# Patient Record
Sex: FEMALE | Race: BLACK OR AFRICAN AMERICAN | NOT HISPANIC OR LATINO | URBAN - METROPOLITAN AREA
[De-identification: names, ages, dates, MRNs, and addresses within clinical notes are randomized per-mention and may not be internally consistent; named-entity substitution may affect disease eponyms.]

---

## 2018-02-02 ENCOUNTER — EMERGENCY (EMERGENCY)
Facility: HOSPITAL | Age: 28
LOS: 1 days | Discharge: HOME | End: 2018-02-02
Attending: EMERGENCY MEDICINE | Admitting: EMERGENCY MEDICINE
Payer: COMMERCIAL

## 2018-02-02 VITALS
SYSTOLIC BLOOD PRESSURE: 121 MMHG | HEART RATE: 71 BPM | DIASTOLIC BLOOD PRESSURE: 78 MMHG | WEIGHT: 138.01 LBS | OXYGEN SATURATION: 98 % | TEMPERATURE: 98 F | RESPIRATION RATE: 18 BRPM

## 2018-02-02 LAB
ALBUMIN SERPL ELPH-MCNC: 4.3 G/DL — SIGNIFICANT CHANGE UP (ref 3.3–5)
ALP SERPL-CCNC: 61 U/L — SIGNIFICANT CHANGE UP (ref 40–120)
ALT FLD-CCNC: 13 U/L RC — SIGNIFICANT CHANGE UP (ref 10–45)
ANION GAP SERPL CALC-SCNC: 11 MMOL/L — SIGNIFICANT CHANGE UP (ref 5–17)
APPEARANCE UR: CLEAR — SIGNIFICANT CHANGE UP
AST SERPL-CCNC: 14 U/L — SIGNIFICANT CHANGE UP (ref 10–40)
BACTERIA # UR AUTO: ABNORMAL /HPF
BILIRUB SERPL-MCNC: 0.3 MG/DL — SIGNIFICANT CHANGE UP (ref 0.2–1.2)
BILIRUB UR-MCNC: NEGATIVE — SIGNIFICANT CHANGE UP
BUN SERPL-MCNC: 9 MG/DL — SIGNIFICANT CHANGE UP (ref 7–23)
CALCIUM SERPL-MCNC: 9.4 MG/DL — SIGNIFICANT CHANGE UP (ref 8.4–10.5)
CHLORIDE SERPL-SCNC: 101 MMOL/L — SIGNIFICANT CHANGE UP (ref 96–108)
CO2 SERPL-SCNC: 26 MMOL/L — SIGNIFICANT CHANGE UP (ref 22–31)
COLOR SPEC: YELLOW — SIGNIFICANT CHANGE UP
COMMENT - URINE: SIGNIFICANT CHANGE UP
CREAT SERPL-MCNC: 0.7 MG/DL — SIGNIFICANT CHANGE UP (ref 0.5–1.3)
DIFF PNL FLD: NEGATIVE — SIGNIFICANT CHANGE UP
EPI CELLS # UR: SIGNIFICANT CHANGE UP /HPF
GLUCOSE SERPL-MCNC: 81 MG/DL — SIGNIFICANT CHANGE UP (ref 70–99)
GLUCOSE UR QL: NEGATIVE — SIGNIFICANT CHANGE UP
HCT VFR BLD CALC: 52.3 % — HIGH (ref 34.5–45)
HGB BLD-MCNC: 15.5 G/DL — SIGNIFICANT CHANGE UP (ref 11.5–15.5)
KETONES UR-MCNC: NEGATIVE — SIGNIFICANT CHANGE UP
LEUKOCYTE ESTERASE UR-ACNC: NEGATIVE — SIGNIFICANT CHANGE UP
LIDOCAIN IGE QN: 23 U/L — SIGNIFICANT CHANGE UP (ref 7–60)
MCHC RBC-ENTMCNC: 27.9 PG — SIGNIFICANT CHANGE UP (ref 27–34)
MCHC RBC-ENTMCNC: 29.7 GM/DL — LOW (ref 32–36)
MCV RBC AUTO: 94 FL — SIGNIFICANT CHANGE UP (ref 80–100)
NITRITE UR-MCNC: NEGATIVE — SIGNIFICANT CHANGE UP
PH UR: 6.5 — SIGNIFICANT CHANGE UP (ref 5–8)
PLATELET # BLD AUTO: 131 K/UL — LOW (ref 150–400)
POTASSIUM SERPL-MCNC: 3.7 MMOL/L — SIGNIFICANT CHANGE UP (ref 3.5–5.3)
POTASSIUM SERPL-SCNC: 3.7 MMOL/L — SIGNIFICANT CHANGE UP (ref 3.5–5.3)
PROT SERPL-MCNC: 7.5 G/DL — SIGNIFICANT CHANGE UP (ref 6–8.3)
PROT UR-MCNC: SIGNIFICANT CHANGE UP
RBC # BLD: 5.56 M/UL — HIGH (ref 3.8–5.2)
RBC # FLD: 11.8 % — SIGNIFICANT CHANGE UP (ref 10.3–14.5)
RBC CASTS # UR COMP ASSIST: SIGNIFICANT CHANGE UP /HPF (ref 0–2)
SODIUM SERPL-SCNC: 138 MMOL/L — SIGNIFICANT CHANGE UP (ref 135–145)
SP GR SPEC: 1.02 — SIGNIFICANT CHANGE UP (ref 1.01–1.02)
UROBILINOGEN FLD QL: NEGATIVE — SIGNIFICANT CHANGE UP
WBC # BLD: 9.8 K/UL — SIGNIFICANT CHANGE UP (ref 3.8–10.5)
WBC # FLD AUTO: 9.8 K/UL — SIGNIFICANT CHANGE UP (ref 3.8–10.5)
WBC UR QL: SIGNIFICANT CHANGE UP /HPF (ref 0–5)

## 2018-02-02 PROCEDURE — 99285 EMERGENCY DEPT VISIT HI MDM: CPT

## 2018-02-02 NOTE — ED PROVIDER NOTE - MEDICAL DECISION MAKING DETAILS
r/o ovarian torsion vs. TOA vs. PID vs. appendicitis. Low clinical suspicion for appendicitis given history and physical exam; labs, pelvic exam, transvaginal US, US appendix (very thin body habitus) r/o ovarian torsion vs. TOA vs. appendicitis. Low clinical suspicion for appendicitis given history and physical exam; labs, pelvic exam, transvaginal US, US appendix (very thin body habitus). Abdomen soft, no guarding or rebound. +Right adnexal TTP on pelvic exam, no abnormal vaginal discharge, afebrile. AMOS.

## 2018-02-02 NOTE — ED ADULT NURSE NOTE - OBJECTIVE STATEMENT
Pt c/o "rlq abd pain that has been on and off x 1 1/2 months . No n/v/d/fever, some frequency of urination."

## 2018-02-02 NOTE — ED PROVIDER NOTE - OBJECTIVE STATEMENT
26 yo female , PMH well controlled asthma presents to the ED with 2.5 weeks intermittent abdominal pain, states it is worse in the RLQ. + urinary frequency, denies other urinary symptoms including dysuria, hematuria, cloudy urine, foul smelling urine, vaginal discharge, vaginal bleeding. States pain is exacerbated by sexual intercourse. Patient  and states monogamous. + chills but denies fever, n/v, diarrhea, constipation, flank pain, rash, sick contacts, recent travel, trauma, previous surgeries. LMP 18. Takes OCP but discontinued recently.     PMD: [facility]

## 2018-02-02 NOTE — ED ADULT NURSE NOTE - CHPI ED SYMPTOMS NEG
no nausea/no hematuria/no blood in stool/no diarrhea/no vomiting/no abdominal distension/no dysuria/no fever/no burning urination

## 2018-02-02 NOTE — ED PROVIDER NOTE - ATTENDING CONTRIBUTION TO CARE
I was physically present for the E/M service provided. I agree with above history, physical, and plan which I have reviewed and edited where appropriate. I was physically present for the key portions of the service provided.    27F p/w 2 weeks of intermittent RLQ pain. Urinary frequency. No vaginal discharge. No N/V/D/C. I was physically present for the E/M service provided. I agree with above history, physical, and plan which I have reviewed and edited where appropriate. I was physically present for the key portions of the service provided.    27F p/w 2 weeks of intermittent RLQ pain. Urinary frequency. No vaginal discharge. No N/V/D/C. See LISA TRINIDAD.

## 2018-02-03 VITALS — TEMPERATURE: 98 F | OXYGEN SATURATION: 100 %

## 2018-02-03 LAB
N GONORRHOEA RRNA SPEC QL NAA+PROBE: SIGNIFICANT CHANGE UP
SPECIMEN SOURCE: SIGNIFICANT CHANGE UP

## 2018-02-03 PROCEDURE — 93975 VASCULAR STUDY: CPT

## 2018-02-03 PROCEDURE — 99284 EMERGENCY DEPT VISIT MOD MDM: CPT | Mod: 25

## 2018-02-03 PROCEDURE — 76705 ECHO EXAM OF ABDOMEN: CPT | Mod: 26,59

## 2018-02-03 PROCEDURE — 76830 TRANSVAGINAL US NON-OB: CPT

## 2018-02-03 PROCEDURE — 81001 URINALYSIS AUTO W/SCOPE: CPT

## 2018-02-03 PROCEDURE — 93976 VASCULAR STUDY: CPT | Mod: 26

## 2018-02-03 PROCEDURE — 76830 TRANSVAGINAL US NON-OB: CPT | Mod: 26

## 2018-02-03 PROCEDURE — 87591 N.GONORRHOEAE DNA AMP PROB: CPT

## 2018-02-03 PROCEDURE — 96374 THER/PROPH/DIAG INJ IV PUSH: CPT

## 2018-02-03 PROCEDURE — 87086 URINE CULTURE/COLONY COUNT: CPT

## 2018-02-03 PROCEDURE — 76705 ECHO EXAM OF ABDOMEN: CPT

## 2018-02-03 PROCEDURE — 83690 ASSAY OF LIPASE: CPT

## 2018-02-03 PROCEDURE — 85027 COMPLETE CBC AUTOMATED: CPT

## 2018-02-03 PROCEDURE — 80053 COMPREHEN METABOLIC PANEL: CPT

## 2018-02-03 RX ORDER — ACETAMINOPHEN 500 MG
1000 TABLET ORAL ONCE
Qty: 0 | Refills: 0 | Status: COMPLETED | OUTPATIENT
Start: 2018-02-02 | End: 2018-02-03

## 2018-02-03 RX ORDER — SODIUM CHLORIDE 9 MG/ML
1000 INJECTION, SOLUTION INTRAVENOUS ONCE
Qty: 0 | Refills: 0 | Status: COMPLETED | OUTPATIENT
Start: 2018-02-03 | End: 2018-02-03

## 2018-02-03 RX ADMIN — Medication 400 MILLIGRAM(S): at 00:14

## 2018-02-03 RX ADMIN — Medication 1000 MILLIGRAM(S): at 02:38

## 2018-02-03 RX ADMIN — SODIUM CHLORIDE 1000 MILLILITER(S): 9 INJECTION, SOLUTION INTRAVENOUS at 01:21

## 2018-02-03 NOTE — ED ADULT NURSE REASSESSMENT NOTE - NS ED NURSE REASSESS COMMENT FT1
Received report from [facility]. Patient resting and comfortable. No complaints of pain at this time. In no signs of acute distress. VSS. Safety and comfort measures provided and maintained.

## 2018-02-04 LAB
CULTURE RESULTS: NO GROWTH — SIGNIFICANT CHANGE UP
SPECIMEN SOURCE: SIGNIFICANT CHANGE UP

## 2020-02-16 ENCOUNTER — TRANSCRIPTION ENCOUNTER (OUTPATIENT)
Age: 30
End: 2020-02-16

## 2020-02-17 ENCOUNTER — INPATIENT (INPATIENT)
Facility: HOSPITAL | Age: 30
LOS: 0 days | Discharge: HOME | DRG: 817 | End: 2020-02-17
Attending: OBSTETRICS & GYNECOLOGY | Admitting: OBSTETRICS & GYNECOLOGY
Payer: COMMERCIAL

## 2020-02-17 ENCOUNTER — RESULT REVIEW (OUTPATIENT)
Age: 30
End: 2020-02-17

## 2020-02-17 VITALS
RESPIRATION RATE: 18 BRPM | OXYGEN SATURATION: 100 % | HEIGHT: 67 IN | HEART RATE: 70 BPM | SYSTOLIC BLOOD PRESSURE: 111 MMHG | WEIGHT: 145.06 LBS | TEMPERATURE: 98 F | DIASTOLIC BLOOD PRESSURE: 75 MMHG

## 2020-02-17 VITALS
TEMPERATURE: 98 F | DIASTOLIC BLOOD PRESSURE: 75 MMHG | HEART RATE: 82 BPM | RESPIRATION RATE: 16 BRPM | SYSTOLIC BLOOD PRESSURE: 115 MMHG | OXYGEN SATURATION: 100 %

## 2020-02-17 DIAGNOSIS — O20.0 THREATENED ABORTION: ICD-10-CM

## 2020-02-17 DIAGNOSIS — O36.80X0 PREGNANCY WITH INCONCLUSIVE FETAL VIABILITY, NOT APPLICABLE OR UNSPECIFIED: ICD-10-CM

## 2020-02-17 LAB
ALBUMIN SERPL ELPH-MCNC: 4.3 G/DL — SIGNIFICANT CHANGE UP (ref 3.3–5)
ALP SERPL-CCNC: 63 U/L — SIGNIFICANT CHANGE UP (ref 40–120)
ALT FLD-CCNC: 14 U/L — SIGNIFICANT CHANGE UP (ref 10–45)
ANION GAP SERPL CALC-SCNC: 12 MMOL/L — SIGNIFICANT CHANGE UP (ref 5–17)
APPEARANCE UR: CLEAR — SIGNIFICANT CHANGE UP
AST SERPL-CCNC: 15 U/L — SIGNIFICANT CHANGE UP (ref 10–40)
BACTERIA # UR AUTO: NEGATIVE — SIGNIFICANT CHANGE UP
BASOPHILS # BLD AUTO: 0.04 K/UL — SIGNIFICANT CHANGE UP (ref 0–0.2)
BASOPHILS NFR BLD AUTO: 0.4 % — SIGNIFICANT CHANGE UP (ref 0–2)
BILIRUB SERPL-MCNC: 0.5 MG/DL — SIGNIFICANT CHANGE UP (ref 0.2–1.2)
BILIRUB UR-MCNC: NEGATIVE — SIGNIFICANT CHANGE UP
BLD GP AB SCN SERPL QL: NEGATIVE — SIGNIFICANT CHANGE UP
BUN SERPL-MCNC: 8 MG/DL — SIGNIFICANT CHANGE UP (ref 7–23)
CALCIUM SERPL-MCNC: 9.5 MG/DL — SIGNIFICANT CHANGE UP (ref 8.4–10.5)
CHLORIDE SERPL-SCNC: 102 MMOL/L — SIGNIFICANT CHANGE UP (ref 96–108)
CO2 SERPL-SCNC: 24 MMOL/L — SIGNIFICANT CHANGE UP (ref 22–31)
COLOR SPEC: YELLOW — SIGNIFICANT CHANGE UP
CREAT SERPL-MCNC: 0.62 MG/DL — SIGNIFICANT CHANGE UP (ref 0.5–1.3)
DIFF PNL FLD: ABNORMAL
EOSINOPHIL # BLD AUTO: 0.19 K/UL — SIGNIFICANT CHANGE UP (ref 0–0.5)
EOSINOPHIL NFR BLD AUTO: 1.8 % — SIGNIFICANT CHANGE UP (ref 0–6)
EPI CELLS # UR: 1 /HPF — SIGNIFICANT CHANGE UP
GLUCOSE SERPL-MCNC: 81 MG/DL — SIGNIFICANT CHANGE UP (ref 70–99)
GLUCOSE UR QL: NEGATIVE — SIGNIFICANT CHANGE UP
HCG SERPL-ACNC: 666.7 MIU/ML — HIGH
HCT VFR BLD CALC: 32.8 % — LOW (ref 34.5–45)
HGB BLD-MCNC: 10.3 G/DL — LOW (ref 11.5–15.5)
HYALINE CASTS # UR AUTO: 2 /LPF — SIGNIFICANT CHANGE UP (ref 0–2)
IMM GRANULOCYTES NFR BLD AUTO: 0.2 % — SIGNIFICANT CHANGE UP (ref 0–1.5)
KETONES UR-MCNC: NEGATIVE — SIGNIFICANT CHANGE UP
LEUKOCYTE ESTERASE UR-ACNC: NEGATIVE — SIGNIFICANT CHANGE UP
LYMPHOCYTES # BLD AUTO: 1.81 K/UL — SIGNIFICANT CHANGE UP (ref 1–3.3)
LYMPHOCYTES # BLD AUTO: 17.2 % — SIGNIFICANT CHANGE UP (ref 13–44)
MCHC RBC-ENTMCNC: 28.9 PG — SIGNIFICANT CHANGE UP (ref 27–34)
MCHC RBC-ENTMCNC: 31.4 GM/DL — LOW (ref 32–36)
MCV RBC AUTO: 92.1 FL — SIGNIFICANT CHANGE UP (ref 80–100)
MONOCYTES # BLD AUTO: 0.85 K/UL — SIGNIFICANT CHANGE UP (ref 0–0.9)
MONOCYTES NFR BLD AUTO: 8.1 % — SIGNIFICANT CHANGE UP (ref 2–14)
NEUTROPHILS # BLD AUTO: 7.6 K/UL — HIGH (ref 1.8–7.4)
NEUTROPHILS NFR BLD AUTO: 72.3 % — SIGNIFICANT CHANGE UP (ref 43–77)
NITRITE UR-MCNC: NEGATIVE — SIGNIFICANT CHANGE UP
NRBC # BLD: 0 /100 WBCS — SIGNIFICANT CHANGE UP (ref 0–0)
PH UR: 6.5 — SIGNIFICANT CHANGE UP (ref 5–8)
PLATELET # BLD AUTO: 253 K/UL — SIGNIFICANT CHANGE UP (ref 150–400)
POTASSIUM SERPL-MCNC: 3.8 MMOL/L — SIGNIFICANT CHANGE UP (ref 3.5–5.3)
POTASSIUM SERPL-SCNC: 3.8 MMOL/L — SIGNIFICANT CHANGE UP (ref 3.5–5.3)
PROT SERPL-MCNC: 7.8 G/DL — SIGNIFICANT CHANGE UP (ref 6–8.3)
PROT UR-MCNC: ABNORMAL
RBC # BLD: 3.56 M/UL — LOW (ref 3.8–5.2)
RBC # FLD: 12.8 % — SIGNIFICANT CHANGE UP (ref 10.3–14.5)
RBC CASTS # UR COMP ASSIST: 0 /HPF — SIGNIFICANT CHANGE UP (ref 0–4)
RH IG SCN BLD-IMP: POSITIVE — SIGNIFICANT CHANGE UP
SODIUM SERPL-SCNC: 138 MMOL/L — SIGNIFICANT CHANGE UP (ref 135–145)
SP GR SPEC: 1.03 — HIGH (ref 1.01–1.02)
UROBILINOGEN FLD QL: NEGATIVE — SIGNIFICANT CHANGE UP
WBC # BLD: 10.51 K/UL — HIGH (ref 3.8–10.5)
WBC # FLD AUTO: 10.51 K/UL — HIGH (ref 3.8–10.5)
WBC UR QL: 2 /HPF — SIGNIFICANT CHANGE UP (ref 0–5)

## 2020-02-17 PROCEDURE — 93975 VASCULAR STUDY: CPT

## 2020-02-17 PROCEDURE — 88342 IMHCHEM/IMCYTCHM 1ST ANTB: CPT | Mod: 26

## 2020-02-17 PROCEDURE — 81001 URINALYSIS AUTO W/SCOPE: CPT

## 2020-02-17 PROCEDURE — 86900 BLOOD TYPING SEROLOGIC ABO: CPT

## 2020-02-17 PROCEDURE — 84702 CHORIONIC GONADOTROPIN TEST: CPT

## 2020-02-17 PROCEDURE — 80053 COMPREHEN METABOLIC PANEL: CPT

## 2020-02-17 PROCEDURE — C1889: CPT

## 2020-02-17 PROCEDURE — 88305 TISSUE EXAM BY PATHOLOGIST: CPT

## 2020-02-17 PROCEDURE — 58661 LAPAROSCOPY REMOVE ADNEXA: CPT

## 2020-02-17 PROCEDURE — 85027 COMPLETE CBC AUTOMATED: CPT

## 2020-02-17 PROCEDURE — 88342 IMHCHEM/IMCYTCHM 1ST ANTB: CPT

## 2020-02-17 PROCEDURE — 87086 URINE CULTURE/COLONY COUNT: CPT

## 2020-02-17 PROCEDURE — 99291 CRITICAL CARE FIRST HOUR: CPT

## 2020-02-17 PROCEDURE — 88305 TISSUE EXAM BY PATHOLOGIST: CPT | Mod: 26

## 2020-02-17 PROCEDURE — 99285 EMERGENCY DEPT VISIT HI MDM: CPT

## 2020-02-17 PROCEDURE — 86901 BLOOD TYPING SEROLOGIC RH(D): CPT

## 2020-02-17 PROCEDURE — 93975 VASCULAR STUDY: CPT | Mod: 26

## 2020-02-17 PROCEDURE — 86850 RBC ANTIBODY SCREEN: CPT

## 2020-02-17 RX ORDER — ONDANSETRON 8 MG/1
4 TABLET, FILM COATED ORAL ONCE
Refills: 0 | Status: DISCONTINUED | OUTPATIENT
Start: 2020-02-17 | End: 2020-02-17

## 2020-02-17 RX ORDER — HYDROMORPHONE HYDROCHLORIDE 2 MG/ML
0.5 INJECTION INTRAMUSCULAR; INTRAVENOUS; SUBCUTANEOUS
Refills: 0 | Status: DISCONTINUED | OUTPATIENT
Start: 2020-02-17 | End: 2020-02-17

## 2020-02-17 RX ORDER — OXYCODONE HYDROCHLORIDE 5 MG/1
1 TABLET ORAL
Qty: 5 | Refills: 0
Start: 2020-02-17 | End: 2020-02-17

## 2020-02-17 RX ORDER — SODIUM CHLORIDE 9 MG/ML
1000 INJECTION, SOLUTION INTRAVENOUS
Refills: 0 | Status: DISCONTINUED | OUTPATIENT
Start: 2020-02-17 | End: 2020-02-17

## 2020-02-17 NOTE — H&P ADULT - ASSESSMENT
The patient is a 28YO  LMP unknown presenting w/ VB, abdominal pain, +bHCG, and peritoneal symptoms. TVUS demonstrates free fluid in abdomen. Patient needs emergent diagnostic laparoscopy for possible ruptured ectopic pregnancy.

## 2020-02-17 NOTE — ED ADULT NURSE NOTE - OBJECTIVE STATEMENT
29 yr old female with  from home c/o abd pain, +UCG positive, LMP: feb 2020, +c/o diarrhea after returning from [place] this morning, at present clear lungs, abd +soft, tender, skin wdi, vaginal bleeding,  present

## 2020-02-17 NOTE — ASU DISCHARGE PLAN (ADULT/PEDIATRIC) - ACTIVITY LEVEL
No douching/No excercise/No heavy lifting/Exercise/No sports/gym/No weight bearing/No tampons/Nothing per vagina/No tub baths/No intercourse

## 2020-02-17 NOTE — BRIEF OPERATIVE NOTE - NSICDXBRIEFPROCEDURE_GEN_ALL_CORE_FT
PROCEDURES:  Laparoscopic left salpingectomy for ectopic pregnancy 17-Feb-2020 17:32:43  Curt Cardona

## 2020-02-17 NOTE — ASU DISCHARGE PLAN (ADULT/PEDIATRIC) - PROVIDER TOKENS
FREE:[LAST:[Clinic],PHONE:[(   )    -],FAX:[(   )    -],ADDRESS:[Please schedule an appointment for 2 week follow up at [address] [place] [address], 2nd floor.    Please schedule an appointment (PHONE #  [phone] )]] FREE:[LAST:[Clinic],PHONE:[(   )    -],FAX:[(   )    -],ADDRESS:[Please schedule an appointment for 2 week follow up at [address] [place] [address], 2nd floor.    Please schedule an appointment (PHONE #  [phone] )]],FREE:[LAST:[Private MD],PHONE:[(   )    -],FAX:[(   )    -],ADDRESS:[Please follow up with your private MD in 48hours for repeat bHCG.]]

## 2020-02-17 NOTE — ED PROVIDER NOTE - CARE PLAN
Principal Discharge DX:	Threatened miscarriage in early pregnancy  Secondary Diagnosis:	Diarrhea Principal Discharge DX:	Threatened miscarriage in early pregnancy  Secondary Diagnosis:	Diarrhea  Secondary Diagnosis:	Pregnancy of unknown anatomic location

## 2020-02-17 NOTE — ASU DISCHARGE PLAN (ADULT/PEDIATRIC) - CALL YOUR DOCTOR IF YOU HAVE ANY OF THE FOLLOWING:
Fever greater than (need to indicate Fahrenheit or Celsius)/Numbness, tingling, color or temperature change to extremity/Inability to tolerate liquids or foods/Bleeding that does not stop/Swelling that gets worse/Excessive diarrhea/Increased irritability or sluggishness/Unable to urinate/Pain not relieved by Medications/Nausea and vomiting that does not stop/Wound/Surgical Site with redness, or foul smelling discharge or pus

## 2020-02-17 NOTE — H&P ADULT - PROBLEM SELECTOR PLAN 1
Plan:  -CBC, T&S, Coags, bHCG  -TVUS  -Monitor VS  -Emergent addon to OR for dx laparoscopy and left salpingectomy for ruptured ectopic pregnancy  -NPO  -LR@125  -ADmit to GYN    [facility] d/w & e/w Dr. Brar

## 2020-02-17 NOTE — CHART NOTE - NSCHARTNOTEFT_GEN_A_CORE
R2 GYN POST-OP CHECK    Patient seen and evaluated at bedside.  Pt awake and alert resting comfortably in chair  Patient reports pain controlled with analgesia.   Pt denies N/V, SOB, CP, palpitations, fever/chills.   Tolerating clears.    Ambulating with assistance.  Tolerating clears    O:   T(C): 36.4 (02-17-20 @ 19:00), Max: 36.4 (02-17-20 @ 17:15)  HR: 89 (02-17-20 @ 19:00) (82 - 106)  BP: 112/72 (02-17-20 @ 19:00) (112/72 - 119/70)  RR: 16 (02-17-20 @ 19:00) (16 - 18)  SpO2: 100% (02-17-20 @ 19:00) (100% - 100%)  Wt(kg): --  I&O's Summary    17 Feb 2020 07:01  -  17 Feb 2020 19:50  --------------------------------------------------------  IN: 550 mL / OUT: 250 mL / NET: 300 mL        Gen: Resting comfortably in bed, NAD  CV: S1S2, RRR  Lungs: CTA B/L  Abd: soft, appropriately tender, occasional BS x 4 quadrants.    Inc: Laproscopic port sites clean/dry/intact, dermabond closure  Ext: SCD's in place and functional, non-tender b/l, no edema    HYDROmorphone  Injectable 0.5 milliGRAM(s) IV Push every 10 minutes PRN  lactated ringers. 1000 milliLiter(s) IV Continuous <Continuous>  ondansetron Injectable 4 milliGRAM(s) IV Push once PRN      A/P: 29y Female s/p lsc L salpingectomy for ruptured ectopic pregnancy. Patient counseled at bedside re: intraop findings including L hematosalpinx and likely aborting L ectopic pregnancy. However, no villi seen on gross examination of tissue intraop. Patient counseled that although likelihood of intrauterine pregnancy is low, recommend follow up in 24-48h with primary ob/gyn outpatient for repeat HCG to confirm removal of ectopic pregnancy. Patient understands, confirmed contact numbers. Patient to be added to gyn list to confirm follow up outpatient.    Neuro: PO Analgesia PRN  CV: Hemodynamically stable.  Monitor VS.   Pulm: Saturating well on room air.  Encourage OOB    GI: Advance to regular diet. Anti-emetics PRN.  : voiding spontaneously  FEN: SLIV  Heme: DVT ppx w/ SCD's while in bed. Early ambulation, initially with assistance then as tolerated.    ID: Afebrile  Endo: No active issues   Dispo: Discharge home when criteria met.     Yael Rodríguez MD PGY2  Pager #[phone]

## 2020-02-17 NOTE — ED PROVIDER NOTE - OBJECTIVE STATEMENT
29y female with no significant PMH presenting with vaginal bleeding with positive urine pregnancy. Had period on 1/19 normal period lasting 5 days. Had recurrence of bleeding on 2/8, again bleeding like her normal period. + u preg at home. Bleeding again starting 3 days ago, spotting, no clots, different from her normal period, also had lower abdominal pain, non-radiating. No fevers, urinary symptoms.

## 2020-02-17 NOTE — BRIEF OPERATIVE NOTE - OPERATION/FINDINGS
Grossly normal bowel, liver, omentum, uterus, ovaries. Approximately 300cc Hemoperitoneum upon entry. Left sided ectopic pregnancy noted appeared to be aborted from left fallopian tube. Bilateral dilated fallopian tubes.

## 2020-02-17 NOTE — ASU DISCHARGE PLAN (ADULT/PEDIATRIC) - PAIN MANAGEMENT
Prescriptions electronically submitted to pharmacy from [facility]/Take over the counter pain medication

## 2020-02-17 NOTE — H&P ADULT - ATTENDING COMMENTS
pt seen and examined. agree with above note. pt counseled regarding need for emergent surgery to evaluate possible ruptured ectopic. informed consent signed and witnessed. all questions answered. to proceed with procedure as stated above.     NEVILLE Brar

## 2020-02-17 NOTE — H&P ADULT - NSHPPHYSICALEXAM_GEN_ALL_CORE
Vital Signs Last 24 Hrs  T(C): 36.6 (17 Feb 2020 10:11), Max: 36.6 (17 Feb 2020 10:11)  T(F): 97.9 (17 Feb 2020 10:11), Max: 97.9 (17 Feb 2020 10:11)  HR: 70 (17 Feb 2020 10:11) (70 - 70)  BP: 111/75 (17 Feb 2020 10:11) (111/75 - 111/75)  BP(mean): --  RR: 18 (17 Feb 2020 10:11) (18 - 18)  SpO2: 100% (17 Feb 2020 10:11) (100% - 100%)    Gen: A&Ox3, uncomfortable  Abd: Soft, Diffuse lower abdominal tenderness, +rebound tenderness  VE: Minimal dark blood in vault, No active bleeding from os, Os closed

## 2020-02-17 NOTE — ASU DISCHARGE PLAN (ADULT/PEDIATRIC) - CARE PROVIDER_API CALL
Clinic,   Please schedule an appointment for 2 week follow up at [address] [place] [address], 2nd floor.    Please schedule an appointment (PHONE #  [phone] )  Phone: (   )    -  Fax: (   )    -  Follow Up Time: Clinic,   Please schedule an appointment for 2 week follow up at [address] [place] [address], 2nd floor.    Please schedule an appointment (PHONE #  [phone] )  Phone: (   )    -  Fax: (   )    -  Follow Up Time:     Private MD,   Please follow up with your private MD in 48hours for repeat bHCG.  Phone: (   )    -  Fax: (   )    -  Follow Up Time:

## 2020-02-17 NOTE — H&P ADULT - HISTORY OF PRESENT ILLNESS
Pt is a 28YO  Unsure of LMP (possibly 2/3) presenting w/ vaginal bleeding and abdominal pain. The pt said that she has had some vaginal spotting and irregular bleeding . She had her period on 2/3. Then had additional vaginal spotting on Friday. She said that the spotting has been persistent but she did not have to change pads at all. Says it is dark blood. No passage of tissue/clots. She said she felt mildly dizzy but is otherwise asymptomatic.    TVUS(): Demonstrates moderate free fluid in left adnexa/pelvis.  C    OBHx: G0  GYN: abnormal pap w/ normal colpo  PMH: Asthma   PSH: Neg  Meds: Ventolin (inactive)  All: NKDA  Psych: neg  Soc: neg  Fhx: neg

## 2020-02-17 NOTE — ASU DISCHARGE PLAN (ADULT/PEDIATRIC) - ASU DC SPECIAL INSTRUCTIONSFT
Please follow up with your private GYN in 48hours for repeat bHCG. bHCG today was 666. If bHCG is not down-trending would consider imaging.

## 2020-02-17 NOTE — ED PROVIDER NOTE - ATTENDING CONTRIBUTION TO CARE
------------ATTENDING NOTE------------   pt w/ partner c/o several days of light uterine spotting, having constant mild ache/pressure in midline pelvis, worse w/ bearing down (to urinate/defecate), also 5 days of increased loose watery nonbloody stools since traveling to [place], recent menstruation has been irregular but last "normal" menstruation 8 wks ago, has increased urinary frequency, no fevers, no chest pain/discomfort or sob/dyspnea, HD stable, soft benign abd, awaiting labs/imaging and close reassessments -->  - Ron Young MD   ------------------------------------------------ ------------ATTENDING NOTE------------   pt w/ partner c/o several days of light uterine spotting, having constant mild ache/pressure in midline pelvis, worse w/ bearing down (to urinate/defecate), also 5 days of increased loose watery nonbloody stools since traveling to [place], recent menstruation has been irregular but last "normal" menstruation 8 wks ago, has increased urinary frequency, no fevers, no chest pain/discomfort or sob/dyspnea, HD stable, soft benign abd, awaiting labs/imaging and close reassessments --> very likely ruptured ectopic, continued pain, evaluated by OBGYN team for admission/OR.  - Ron Young MD   ------------------------------------------------

## 2020-02-18 LAB
CULTURE RESULTS: NO GROWTH — SIGNIFICANT CHANGE UP
SPECIMEN SOURCE: SIGNIFICANT CHANGE UP

## 2020-02-19 PROBLEM — Z00.00 ENCOUNTER FOR PREVENTIVE HEALTH EXAMINATION: Status: ACTIVE | Noted: 2020-02-19

## 2020-02-19 NOTE — CHART NOTE - NSCHARTNOTEFT_GEN_A_CORE
LMP 2/3 a/w ruptured ectopic s/p Lsc L salpingectomy, evacuation of hemoperitoneum on     Called and spoke with patient. Pt feeling well postoperatively. Mild bleeding/abdominal cramping. Pain controlled with motrin/tylenol. No N/V, no fever/chills.   Pt seen by primary gyn at [facility] today. Pt reports blood were drawn, exam was normal. Pt for repeat labs on Friday with appt scheduled.     Pt recovering well postoperatively and with appropriate follow up and care from ectopic pregnancy.   Inpt Gyn Team to sign off.   Will follow up surgical pathology, and notify pt if findings are abnormal.     sarah beth Galvez PGY1

## 2021-05-24 ENCOUNTER — ASOB RESULT (OUTPATIENT)
Age: 31
End: 2021-05-24

## 2021-05-24 ENCOUNTER — APPOINTMENT (OUTPATIENT)
Dept: ANTEPARTUM | Facility: CLINIC | Age: 31
End: 2021-05-24
Payer: COMMERCIAL

## 2021-05-24 PROCEDURE — 76811 OB US DETAILED SNGL FETUS: CPT

## 2021-05-24 PROCEDURE — 99072 ADDL SUPL MATRL&STAF TM PHE: CPT

## 2021-09-29 ENCOUNTER — ASOB RESULT (OUTPATIENT)
Age: 31
End: 2021-09-29

## 2021-09-29 ENCOUNTER — APPOINTMENT (OUTPATIENT)
Dept: ANTEPARTUM | Facility: CLINIC | Age: 31
End: 2021-09-29
Payer: COMMERCIAL

## 2021-09-29 PROCEDURE — 76816 OB US FOLLOW-UP PER FETUS: CPT

## 2021-09-29 PROCEDURE — 76819 FETAL BIOPHYS PROFIL W/O NST: CPT

## 2021-10-12 ENCOUNTER — INPATIENT (INPATIENT)
Facility: HOSPITAL | Age: 31
LOS: 2 days | Discharge: HOME | End: 2021-10-15
Attending: OBSTETRICS & GYNECOLOGY | Admitting: OBSTETRICS & GYNECOLOGY

## 2021-10-12 VITALS
RESPIRATION RATE: 16 BRPM | SYSTOLIC BLOOD PRESSURE: 125 MMHG | HEART RATE: 60 BPM | DIASTOLIC BLOOD PRESSURE: 76 MMHG | TEMPERATURE: 100 F

## 2021-10-12 DIAGNOSIS — Z90.79 ACQUIRED ABSENCE OF OTHER GENITAL ORGAN(S): Chronic | ICD-10-CM

## 2021-10-12 DIAGNOSIS — Z3A.00 WEEKS OF GESTATION OF PREGNANCY NOT SPECIFIED: ICD-10-CM

## 2021-10-12 DIAGNOSIS — O26.899 OTHER SPECIFIED PREGNANCY RELATED CONDITIONS, UNSPECIFIED TRIMESTER: ICD-10-CM

## 2021-10-13 DIAGNOSIS — O36.8130 DECREASED FETAL MOVEMENTS, THIRD TRIMESTER, NOT APPLICABLE OR UNSPECIFIED: ICD-10-CM

## 2021-10-13 DIAGNOSIS — O26.899 OTHER SPECIFIED PREGNANCY RELATED CONDITIONS, UNSPECIFIED TRIMESTER: ICD-10-CM

## 2021-10-13 LAB
BASOPHILS # BLD AUTO: 0.03 K/UL — SIGNIFICANT CHANGE UP (ref 0–0.2)
BASOPHILS NFR BLD AUTO: 0.3 % — SIGNIFICANT CHANGE UP (ref 0–2)
BLD GP AB SCN SERPL QL: NEGATIVE — SIGNIFICANT CHANGE UP
COVID-19 SPIKE DOMAIN AB INTERP: POSITIVE
COVID-19 SPIKE DOMAIN ANTIBODY RESULT: 69.6 U/ML — HIGH
EOSINOPHIL # BLD AUTO: 0.03 K/UL — SIGNIFICANT CHANGE UP (ref 0–0.5)
EOSINOPHIL NFR BLD AUTO: 0.3 % — SIGNIFICANT CHANGE UP (ref 0–6)
HBV SURFACE AG SERPL QL IA: SIGNIFICANT CHANGE UP
HCT VFR BLD CALC: 37.4 % — SIGNIFICANT CHANGE UP (ref 34.5–45)
HGB BLD-MCNC: 12.8 G/DL — SIGNIFICANT CHANGE UP (ref 11.5–15.5)
IANC: 7.52 K/UL — SIGNIFICANT CHANGE UP (ref 1.5–8.5)
IMM GRANULOCYTES NFR BLD AUTO: 1.1 % — SIGNIFICANT CHANGE UP (ref 0–1.5)
LYMPHOCYTES # BLD AUTO: 1.91 K/UL — SIGNIFICANT CHANGE UP (ref 1–3.3)
LYMPHOCYTES # BLD AUTO: 18.2 % — SIGNIFICANT CHANGE UP (ref 13–44)
MCHC RBC-ENTMCNC: 30.8 PG — SIGNIFICANT CHANGE UP (ref 27–34)
MCHC RBC-ENTMCNC: 34.2 GM/DL — SIGNIFICANT CHANGE UP (ref 32–36)
MCV RBC AUTO: 89.9 FL — SIGNIFICANT CHANGE UP (ref 80–100)
MONOCYTES # BLD AUTO: 0.91 K/UL — HIGH (ref 0–0.9)
MONOCYTES NFR BLD AUTO: 8.7 % — SIGNIFICANT CHANGE UP (ref 2–14)
NEUTROPHILS # BLD AUTO: 7.52 K/UL — HIGH (ref 1.8–7.4)
NEUTROPHILS NFR BLD AUTO: 71.4 % — SIGNIFICANT CHANGE UP (ref 43–77)
NRBC # BLD: 0 /100 WBCS — SIGNIFICANT CHANGE UP
NRBC # FLD: 0 K/UL — SIGNIFICANT CHANGE UP
PLATELET # BLD AUTO: 164 K/UL — SIGNIFICANT CHANGE UP (ref 150–400)
RBC # BLD: 4.16 M/UL — SIGNIFICANT CHANGE UP (ref 3.8–5.2)
RBC # FLD: 13.9 % — SIGNIFICANT CHANGE UP (ref 10.3–14.5)
RH IG SCN BLD-IMP: POSITIVE — SIGNIFICANT CHANGE UP
RH IG SCN BLD-IMP: POSITIVE — SIGNIFICANT CHANGE UP
RUBV IGG SER-ACNC: 2.6 INDEX — SIGNIFICANT CHANGE UP
RUBV IGG SER-IMP: POSITIVE — SIGNIFICANT CHANGE UP
SARS-COV-2 IGG+IGM SERPL QL IA: 69.6 U/ML — HIGH
SARS-COV-2 IGG+IGM SERPL QL IA: POSITIVE
SARS-COV-2 RNA SPEC QL NAA+PROBE: SIGNIFICANT CHANGE UP
T PALLIDUM AB TITR SER: NEGATIVE — SIGNIFICANT CHANGE UP
WBC # BLD: 10.52 K/UL — HIGH (ref 3.8–10.5)
WBC # FLD AUTO: 10.52 K/UL — HIGH (ref 3.8–10.5)

## 2021-10-13 RX ORDER — SODIUM CHLORIDE 9 MG/ML
1000 INJECTION, SOLUTION INTRAVENOUS
Refills: 0 | Status: DISCONTINUED | OUTPATIENT
Start: 2021-10-13 | End: 2021-10-13

## 2021-10-13 RX ORDER — OXYTOCIN 10 UNIT/ML
333.33 VIAL (ML) INJECTION
Qty: 20 | Refills: 0 | Status: DISCONTINUED | OUTPATIENT
Start: 2021-10-13 | End: 2021-10-15

## 2021-10-13 RX ORDER — INFLUENZA VIRUS VACCINE 15; 15; 15; 15 UG/.5ML; UG/.5ML; UG/.5ML; UG/.5ML
0.5 SUSPENSION INTRAMUSCULAR ONCE
Refills: 0 | Status: DISCONTINUED | OUTPATIENT
Start: 2021-10-13 | End: 2021-10-15

## 2021-10-13 RX ORDER — ALBUTEROL 90 UG/1
2 AEROSOL, METERED ORAL EVERY 6 HOURS
Refills: 0 | Status: DISCONTINUED | OUTPATIENT
Start: 2021-10-13 | End: 2021-10-15

## 2021-10-13 RX ORDER — BUTORPHANOL TARTRATE 2 MG/ML
2 INJECTION, SOLUTION INTRAMUSCULAR; INTRAVENOUS ONCE
Refills: 0 | Status: DISCONTINUED | OUTPATIENT
Start: 2021-10-13 | End: 2021-10-13

## 2021-10-13 RX ORDER — OXYTOCIN 10 UNIT/ML
333.33 VIAL (ML) INJECTION
Qty: 20 | Refills: 0 | Status: DISCONTINUED | OUTPATIENT
Start: 2021-10-13 | End: 2021-10-13

## 2021-10-13 RX ADMIN — SODIUM CHLORIDE 125 MILLILITER(S): 9 INJECTION, SOLUTION INTRAVENOUS at 01:04

## 2021-10-13 RX ADMIN — BUTORPHANOL TARTRATE 2 MILLIGRAM(S): 2 INJECTION, SOLUTION INTRAMUSCULAR; INTRAVENOUS at 14:23

## 2021-10-13 RX ADMIN — Medication 0.25 MILLIGRAM(S): at 19:43

## 2021-10-13 RX ADMIN — BUTORPHANOL TARTRATE 2 MILLIGRAM(S): 2 INJECTION, SOLUTION INTRAMUSCULAR; INTRAVENOUS at 14:50

## 2021-10-13 NOTE — OB PROVIDER H&P - PROBLEM SELECTOR PLAN 1
d/w Dr Johnson admit for post dates, decreased fetal movement, for IOL @ 40.1 wks  for PO Cytotec for IOL post dates  pain management as needed  prenatals ordered  covid swab sent  repeat VE

## 2021-10-13 NOTE — OB PROVIDER H&P - ASSESSMENT
32 yo  @ 40.1 wks c/o contractions all day today more constant, denies vb or lof, reports decreased fetal movement today. AP course complicated by hx of asthma. denies fever chills ha n/v new swelling vision changes epigastric pain cp sob or cough. last saw OB last Wednesday cervix closed. EFW 7#8.    meds: inhaler, PNV  All: denies  PMH: asthma  PSH: denies  gyn hx: irreg pap 2021 will have postpartum follow up  ob hx: ectopic  salpingectomy    d/w Dr Johnson admit for post dates, decreased fetal movement, for IOL @ 40.1 wks  for PO Cytotec for IOL post dates  pain management as needed  prenatals ordered  covid swab sent  repeat VE

## 2021-10-13 NOTE — OB PROVIDER TRIAGE NOTE - NSHPPHYSICALEXAM_GEN_ALL_CORE
abd soft gravid NT  LS clear bilaterally  TAS: vertex  anterior placenta  JÚNIOR: 6  BPP: 8/8  SVE: 0/50/-3  FHT: moderate variability, +accels, + variables  toco: q 3-8 min  Vital Signs Last 24 Hrs  T(C): 37.6 (12 Oct 2021 22:08), Max: 37.6 (12 Oct 2021 22:08)  T(F): 99.7 (12 Oct 2021 22:08), Max: 99.7 (12 Oct 2021 22:08)  HR: 60 (12 Oct 2021 22:15) (60 - 60)  BP: 125/76 (12 Oct 2021 22:15) (125/76 - 125/76)  BP(mean): --  RR: 16 (12 Oct 2021 22:08) (16 - 16)  SpO2: --

## 2021-10-13 NOTE — OB PROVIDER TRIAGE NOTE - LABOR: CERVICAL CONSISTENCY
If you have and questions or concerns about today's appointment, the verbal and/or written instructions you were given for follow up care, please call our office at [phone].      [facility] - [address]  Office: [phone]   Fax: [phone] soft

## 2021-10-13 NOTE — OB PROVIDER H&P - NSHPPHYSICALEXAM_GEN_ALL_CORE
abd soft gravid NT  LS clear bilaterally  CV RRR  TAS: vertex  anterior placenta  JÚNIOR: 6  BPP: 8/8  SVE: 0/50/-3  FHT: moderate variability, +accels, + variables  toco: q 3-8 min  Vital Signs Last 24 Hrs  T(C): 37.6 (12 Oct 2021 22:08), Max: 37.6 (12 Oct 2021 22:08)  T(F): 99.7 (12 Oct 2021 22:08), Max: 99.7 (12 Oct 2021 22:08)  HR: 60 (12 Oct 2021 22:15) (60 - 60)  BP: 125/76 (12 Oct 2021 22:15) (125/76 - 125/76)  BP(mean): --  RR: 16 (12 Oct 2021 22:08) (16 - 16)  SpO2: --

## 2021-10-13 NOTE — OB PROVIDER TRIAGE NOTE - HISTORY OF PRESENT ILLNESS
30 yo  @ 40.1 wks c/o contractions all day today more constant, denies vb or lof, reports decreased fetal movement today. AP course complicated by hx of asthma. denies fever chills ha n/v new swelling vision changes epigastric pain cp sob or cough. last saw OB last Wednesday cervix closed. EFW 7#8.    meds: inhaler, PNV  All: denies  PMH: asthma  PSH: denies  gyn hx: irreg pap 2021 will have postpartum follow up  ob hx: ectopic 2020 salpingectomy

## 2021-10-13 NOTE — OB PROVIDER H&P - HISTORY OF PRESENT ILLNESS
32 yo  @ 40.1 wks c/o contractions all day today more constant, denies vb or lof, reports decreased fetal movement today. AP course complicated by hx of asthma. denies fever chills ha n/v new swelling vision changes epigastric pain cp sob or cough. last saw OB last Wednesday cervix closed. EFW 7#8.    meds: inhaler, PNV  All: denies  PMH: asthma  PSH: denies  gyn hx: irreg pap 2021 will have postpartum follow up  ob hx: ectopic 2020 salpingectomy

## 2021-10-13 NOTE — CHART NOTE - NSCHARTNOTEFT_GEN_A_CORE
Attending note     went to the room for deceleration   received ephedrine and terbutaline   SVE 5 cm   FHTs returned to baseline     R Raul HARRIS

## 2021-10-13 NOTE — CHART NOTE - NSCHARTNOTEFT_GEN_A_CORE
Attending Note    30 yo  with IUP 40 1/7wks ga presents to labor and delivery for induction of labor due to decreased fetal movements and JÚNIOR 6, Patient evaluated at bedside Patient reports contractions are present but declines any analgesia at this time  VS T 98  P 60 R 18 /76    Heent nl  abd gravid, AGA, toco ctx q 7-10min  's reactive category I  ext no CCE  Neuro x 3    a: 30 yo  40 1/7wks ga, post dates, Decreased FM, JÚNIOR 6cm  P: Induction of labor with oral cytotec     Plan of care discussed with patient     Anticipate KEYANNA CHENeailsail

## 2021-10-13 NOTE — OB PROVIDER TRIAGE NOTE - NSOBPROVIDERNOTE_OBGYN_ALL_OB_FT
30 yo  @ 40.1 wks c/o contractions all day today more constant, denies vb or lof, reports decreased fetal movement today. AP course complicated by hx of asthma. denies fever chills ha n/v new swelling vision changes epigastric pain cp sob or cough. last saw OB last Wednesday cervix closed. EFW 7#8.    meds: inhaler, PNV  All: denies  PMH: asthma  PSH: denies  gyn hx: irreg pap 2021 will have postpartum follow up  ob hx: ectopic  salpingectomy    d/w Dr Johnson admit for post dates, decreased fetal movement, for IOL @ 40.1 wks  for PO Cytotec for IOL post dates  pain management as needed  prenatals ordered  covid swab sent  repeat VE

## 2021-10-14 RX ORDER — BENZOCAINE 10 %
1 GEL (GRAM) MUCOUS MEMBRANE EVERY 6 HOURS
Refills: 0 | Status: DISCONTINUED | OUTPATIENT
Start: 2021-10-14 | End: 2021-10-15

## 2021-10-14 RX ORDER — IBUPROFEN 200 MG
600 TABLET ORAL EVERY 6 HOURS
Refills: 0 | Status: COMPLETED | OUTPATIENT
Start: 2021-10-14 | End: 2022-09-12

## 2021-10-14 RX ORDER — OXYCODONE HYDROCHLORIDE 5 MG/1
5 TABLET ORAL
Refills: 0 | Status: DISCONTINUED | OUTPATIENT
Start: 2021-10-14 | End: 2021-10-15

## 2021-10-14 RX ORDER — LANOLIN
1 OINTMENT (GRAM) TOPICAL EVERY 6 HOURS
Refills: 0 | Status: DISCONTINUED | OUTPATIENT
Start: 2021-10-14 | End: 2021-10-15

## 2021-10-14 RX ORDER — SIMETHICONE 80 MG/1
80 TABLET, CHEWABLE ORAL EVERY 4 HOURS
Refills: 0 | Status: DISCONTINUED | OUTPATIENT
Start: 2021-10-14 | End: 2021-10-15

## 2021-10-14 RX ORDER — MAGNESIUM HYDROXIDE 400 MG/1
30 TABLET, CHEWABLE ORAL
Refills: 0 | Status: DISCONTINUED | OUTPATIENT
Start: 2021-10-14 | End: 2021-10-15

## 2021-10-14 RX ORDER — OXYTOCIN 10 UNIT/ML
333.33 VIAL (ML) INJECTION
Qty: 20 | Refills: 0 | Status: DISCONTINUED | OUTPATIENT
Start: 2021-10-14 | End: 2021-10-15

## 2021-10-14 RX ORDER — AER TRAVELER 0.5 G/1
1 SOLUTION RECTAL; TOPICAL EVERY 4 HOURS
Refills: 0 | Status: DISCONTINUED | OUTPATIENT
Start: 2021-10-14 | End: 2021-10-15

## 2021-10-14 RX ORDER — DIBUCAINE 1 %
1 OINTMENT (GRAM) RECTAL EVERY 6 HOURS
Refills: 0 | Status: DISCONTINUED | OUTPATIENT
Start: 2021-10-14 | End: 2021-10-15

## 2021-10-14 RX ORDER — OXYCODONE HYDROCHLORIDE 5 MG/1
5 TABLET ORAL ONCE
Refills: 0 | Status: DISCONTINUED | OUTPATIENT
Start: 2021-10-14 | End: 2021-10-15

## 2021-10-14 RX ORDER — KETOROLAC TROMETHAMINE 30 MG/ML
30 SYRINGE (ML) INJECTION ONCE
Refills: 0 | Status: DISCONTINUED | OUTPATIENT
Start: 2021-10-14 | End: 2021-10-14

## 2021-10-14 RX ORDER — SODIUM CHLORIDE 9 MG/ML
3 INJECTION INTRAMUSCULAR; INTRAVENOUS; SUBCUTANEOUS EVERY 8 HOURS
Refills: 0 | Status: DISCONTINUED | OUTPATIENT
Start: 2021-10-14 | End: 2021-10-15

## 2021-10-14 RX ORDER — HYDROCORTISONE 1 %
1 OINTMENT (GRAM) TOPICAL EVERY 6 HOURS
Refills: 0 | Status: DISCONTINUED | OUTPATIENT
Start: 2021-10-14 | End: 2021-10-15

## 2021-10-14 RX ORDER — PRAMOXINE HYDROCHLORIDE 150 MG/15G
1 AEROSOL, FOAM RECTAL EVERY 4 HOURS
Refills: 0 | Status: DISCONTINUED | OUTPATIENT
Start: 2021-10-14 | End: 2021-10-15

## 2021-10-14 RX ORDER — TETANUS TOXOID, REDUCED DIPHTHERIA TOXOID AND ACELLULAR PERTUSSIS VACCINE, ADSORBED 5; 2.5; 8; 8; 2.5 [IU]/.5ML; [IU]/.5ML; UG/.5ML; UG/.5ML; UG/.5ML
0.5 SUSPENSION INTRAMUSCULAR ONCE
Refills: 0 | Status: DISCONTINUED | OUTPATIENT
Start: 2021-10-14 | End: 2021-10-15

## 2021-10-14 RX ORDER — IBUPROFEN 200 MG
600 TABLET ORAL EVERY 6 HOURS
Refills: 0 | Status: DISCONTINUED | OUTPATIENT
Start: 2021-10-14 | End: 2021-10-15

## 2021-10-14 RX ORDER — ACETAMINOPHEN 500 MG
975 TABLET ORAL
Refills: 0 | Status: DISCONTINUED | OUTPATIENT
Start: 2021-10-14 | End: 2021-10-15

## 2021-10-14 RX ORDER — DIPHENHYDRAMINE HCL 50 MG
25 CAPSULE ORAL EVERY 6 HOURS
Refills: 0 | Status: DISCONTINUED | OUTPATIENT
Start: 2021-10-14 | End: 2021-10-15

## 2021-10-14 RX ADMIN — SODIUM CHLORIDE 3 MILLILITER(S): 9 INJECTION INTRAMUSCULAR; INTRAVENOUS; SUBCUTANEOUS at 22:11

## 2021-10-14 RX ADMIN — Medication 1 TABLET(S): at 11:36

## 2021-10-14 RX ADMIN — Medication 975 MILLIGRAM(S): at 15:20

## 2021-10-14 RX ADMIN — Medication 30 MILLIGRAM(S): at 03:24

## 2021-10-14 RX ADMIN — Medication 975 MILLIGRAM(S): at 16:18

## 2021-10-14 RX ADMIN — Medication 600 MILLIGRAM(S): at 11:36

## 2021-10-14 RX ADMIN — Medication 975 MILLIGRAM(S): at 09:00

## 2021-10-14 RX ADMIN — Medication 30 MILLIGRAM(S): at 04:24

## 2021-10-14 RX ADMIN — Medication 1000 MILLIUNIT(S)/MIN: at 01:12

## 2021-10-14 RX ADMIN — Medication 975 MILLIGRAM(S): at 08:12

## 2021-10-14 RX ADMIN — SODIUM CHLORIDE 3 MILLILITER(S): 9 INJECTION INTRAMUSCULAR; INTRAVENOUS; SUBCUTANEOUS at 05:27

## 2021-10-14 RX ADMIN — SODIUM CHLORIDE 3 MILLILITER(S): 9 INJECTION INTRAMUSCULAR; INTRAVENOUS; SUBCUTANEOUS at 14:07

## 2021-10-14 RX ADMIN — SODIUM CHLORIDE 125 MILLILITER(S): 9 INJECTION, SOLUTION INTRAVENOUS at 01:12

## 2021-10-14 RX ADMIN — Medication 600 MILLIGRAM(S): at 12:30

## 2021-10-14 NOTE — OB NEONATOLOGY/PEDIATRICIAN DELIVERY SUMMARY - NSPEDSNEONOTESA_OBGYN_ALL_OB_FT
40.2wk female born via vaccuum assisted VD to a 32 y/o  blood type O+ mother. Peds called in for category 2 FHT. Maternal history of Asthma last used albuterol in , ectopic pregnancy salpingectomy in , abnormal pap smear in 2021, Covid pending, pt vaccinated. Prenatal hx uncomplicated. PNL -/-/NR/I, GBS - on 9/15. AROM at 21:00 10/13 with clear fluids. Baby emerged cephalic, NCx1 vigorous, crying, was w/d/s/s with APGARS of 9/9 . Mom plans to initiate breast and bottle feeding, consents Hep B vaccine. maternal tmax 37.1  EOS 0.11

## 2021-10-14 NOTE — OB NEONATOLOGY/PEDIATRICIAN DELIVERY SUMMARY - NS_GESTAGEATBIRTHA_OBGYN_ALL_OB_FT
Informed patient's daughter Marika about recent labs that demonstrate evidence of persistent hyponatremia (was 127 on our lab and 125 during recent hospitalization 12/2019). 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 Will provide referral for Dr. Carrasco of whom patient has not seen lately. Also informed patient that rheumatology would be reaching out to discuss appointment and possible alternative medications for her RA.   
40w2d

## 2021-10-14 NOTE — OB RN DELIVERY SUMMARY - NS_SEPSISRSKCALC_OBGYN_ALL_OB_FT
EOS calculated successfully. EOS Risk Factor: 0.5/1000 live births ([facility] national incidence); GA=40w2d; Temp=99.7; ROM=3.033; GBS='Negative'; Antibiotics='No antibiotics or any antibiotics < 2 hrs prior to birth'

## 2021-10-14 NOTE — OB RN DELIVERY SUMMARY - NSSELHIDDEN_OBGYN_ALL_OB_FT
[NS_DeliveryAttending1_OBGYN_ALL_OB_FT:[id number]],[NS_DeliveryAssist1_OBGYN_ALL_OB_FT:[id number]],[NS_DeliveryAssist2_OBGYN_ALL_OB_FT:[id number]],[NS_DeliveryRN_OBGYN_ALL_OB_FT:[id number]]

## 2021-10-14 NOTE — OB PROVIDER DELIVERY SUMMARY - NSSELHIDDEN_OBGYN_ALL_OB_FT
[NS_DeliveryAttending1_OBGYN_ALL_OB_FT:[id number]],[NS_DeliveryAssist1_OBGYN_ALL_OB_FT:[id number]]

## 2021-10-14 NOTE — OB PROVIDER DELIVERY SUMMARY - NSPROVIDERDELIVERYNOTE_OBGYN_ALL_OB_FT
PATIENT WITH GOOD PUSHING EFFORT  CATEGORY-2 FH NOTED  DECISION MADE TO HELP EXPEDITE DELIVERY BY VACUUM APPLICATION  FETAL HEAD AT +3 STATION  A MEDIAN EPISIOTOMY ALSO DONE TO HELP WITH DELIVERY  VACUUM APPLIED AND ONLY ONE PULL DONE TO ACHIEVE DELIVERY  DELIVERED A LIVE FEMALE BABY FROM OA POSITION  TIGHT NUCHAL CORD X1 NOTED  DELIVERY OTHERWISE UN-EVENTFUL  PEDS IN THE ROOM  DELAYED CORD CLAMP DONE--CORD GASES SENT  PLACENTA DELIVERED SPONT AND COMPLETE  NO LACERATIONS NOTED  EPIS REPAIRED IN ROUTINE FASHION WITH A 2-0 CHROMIC  FUNDUS FIRM WITH NORMAL LOCHIA  BABY TO REGULAR NURSERY

## 2021-10-14 NOTE — LACTATION INITIAL EVALUATION - POLY CYSTIC OVARIAN SYNDROME
"[facility] Medicine  Discharge Summary      Patient Name: Nicole Lala  MRN: [id number]  Admission Date: 9/11/2017  Hospital Length of Stay: 9 days  Discharge Date and Time: 9/20/2017  5:09 PM  Attending Physician: No att. providers found   Discharging Provider: James Chahal MD  Primary Care Provider: Brandon Gray MD      HPI:   Nicole Lala is a 86 y.o. female with a history of HTN, CKD, A-fib (on coumadin), Lymphedema and DVT.  She was admitted to the service of hospital medicine with acute on chronic hypoxic respiratory failure.  She presented to the ED with complaints of severe SOB while getting into the car from her wheelchair.  She reports a week or so of a "cold" describing cough with brown phlegm associated with general weakness and an intermittentently sore throat. She denies any attempted OTC medications for symptoms. She had an PCP appointment today but presented to the ED after becoming SOB. The SOB is notable worse with exertion. Pt also reports she had blood work yesterday and was advised to take 1/2 of her coumadin daily 5 mg Rx yesterday and return to the 5 mg q.d. Pt reports 2L of oxygen use at home which she wears mainly when resting. She denies any worsening of her chronic LE swelling, chest pain, or syncope. Pt denies fever/chills, abd pain, and tobacco use.     * No surgery found *      Indwelling Lines/Drains at time of discharge:   Lines/Drains/Airways          No matching active lines, drains, or airways        Hospital Course:   Patient was admitted with pneumonia and CHF.  Was given IVAB and placed on Lasix IV infusion.  Her breathing improved.  She was taken off the Lasix drip.  She was also given IV Cardizem for rapid atrial fibrillation.  Once her oral Cardizem took over, she was taken off that infusion.  She was started on prednisone for bronchospasms.  Given severity of respiratory symptoms and uncertain diagnosis, pulmonology consulted and CT " chest without contrast performed.  CT chest showed no significant airspace disease, possibly some very minimal interstitial process.  Dr. Yoo of pulmonology felt that most likely patient had underlying COPD, ABG confirmed presence of hypercapnia and spirometry in house was consistent with obstructive process.  NIPPV was prescribed on discharge for use nightly as well as nebulizer machine, brief course of steroids and intermittent dosing of loop diuretic. Patient and family expressed understanding of plan of care and as symptoms improved significantly, she was discharged home with home health.     Consults:   Consults         Status Ordering Provider     Inpatient consult to Cardiology  Once     Provider:  Radhames Dowling MD    Completed GRACIELA ESPITIA     Inpatient consult to Pulmonology  Once     Provider:  Ajit Yoo MD    Completed YESICA GARCIA     Inpatient consult to Social Work/Case Management  Once     Provider:  (Not yet assigned)    Completed YESICA GARCIA     Inpatient consult to Social Work/Case Management  Once     Provider:  (Not yet assigned)    Completed AJIT YOO     Nutrition Services Referral  Once     Provider:  (Not yet assigned)    Completed DIMITRIOS ZIMMERMAN          Significant Diagnostic Studies: Labs: All labs within the past 24 hours have been reviewed  Microbiology:   Blood Culture   Lab Results   Component Value Date    LABBLOO No growth after 5 days. 09/11/2017    LABBLOO No growth after 5 days. 09/11/2017   , Sputum Culture   Lab Results   Component Value Date    GSRESP <10 epithelial cells per low power field. 09/13/2017    GSRESP Few WBC's 09/13/2017    GSRESP Few Gram positive cocci 09/13/2017    GSRESP Few Gram positive rods 09/13/2017    GSRESP Few Gram negative rods 09/13/2017    RESPIRATORYC Normal respiratory marisol 09/13/2017    and Urine Culture    Lab Results   Component Value Date    LABURIN  08/23/2016     Multiple organisms isolated. None in predominance.  Repeat  if    LABURIN clinically necessary. 08/23/2016     Radiology:   NM Lung Ventilation Perfusion Imaging [[id number]] Resulted: 09/19/17 0939   Order Status: Completed Updated: 09/19/17 0939   Narrative:     Comparison: 9/28/15    Technique: Posterior ventilation images are obtained in single breath, equilibrium and washout phases following the administration of 20 mCi aerosolized xenon-133. Subsequently, perfusion images are obtained in the standard projections following the administration of 5.5 mCi technetium 99m MAA intravenously.    Findings: There is mildly heterogeneous perfusion with stable central linear defects noted in each lung, possibly reflective of CHF. Small peripheral defects are noted which appear matched on the posterior ventilation/perfusion projections and appear stable. No moderate or large defect is identified. Mild areas of air trapping in the left lower lung are noted.   Impression:       1.  Stable examination when compared to 9/28/15. Low probability (<20%) for pulmonary embolism.      Electronically signed by: Michaelle Mishra MD  Date: 09/19/17  Time: 09:39    CT Chest Without Contrast [[id number]] Resulted: 09/18/17 1427   Order Status: Completed Updated: 09/18/17 1427   Narrative:     Comparison: 9/18/17 and prior including CT chest dated 11/8/13    Technique: Axial noncontrast 5 mm images are reviewed through the chest along with coronal and sagittal reconstructions.    Findings: The visualized thyroid gland is unremarkable. No supraclavicular or axillary lymphadenopathy is seen. There is mediastinal lipomatosis. Cardiomegaly is noted. No acute mediastinal abnormality is seen.    The central tracheobronchial tree is patent.    The study was moderately degraded by motion artifact. There is bibasilar subsegmental atelectasis. A small left pleural effusion is present. No focal alveolar consolidation to suggest acute pneumonia is identified. There is mild interlobular septal thickening  suggestive of mild interstitial pulmonary edema. No focal pulmonary nodule is seen allowing for motion limitations.    Cholelithiasis is noted. No acute findings are noted in the visualized upper abdomen.    Multilevel degenerative disc disease is seen. There is thoracic dextroscoliosis. No acute osseous abnormality is seen.   Impression:       1.  Cardiomegaly with a mild pulmonary interstitial edema/CHF pattern and a small left pleural effusion. Mild bibasilar atelectasis. Study was moderately degraded by motion artifact.  2. Cholelithiasis.      Electronically signed by: Michaelle Mishra MD  Date: 09/18/17  Time: 14:27    X-Ray Chest 1 View [[id number]] Resulted: 09/18/17 0909   Order Status: Completed Updated: 09/18/17 0910   Narrative:     Comparison study: 9/16/2017  One view chest  There is right basilar infiltrate suggesting pneumonia.  There is mild left basilar infiltrate which may also be new with left lung base not well visualized on the portable film.  There is no pleural effusion.  The cardiomediastinal silhouette is stable   Impression:         Interval development of right basilar infiltrate suggesting pneumonia.  Probable new mild left basilar infiltrate as well differential includes CHF      Electronically signed by: FADIA GUERRA MD  Date: 09/18/17  Time: 09:09    X-Ray Chest 1 View [[id number]] Resulted: 09/16/17 1119   Order Status: Completed Updated: 09/16/17 1119   Narrative:     Comparison: Chest x-ray-9/11/2017    Technique: A single semi-erect AP chest radiograph was acquired.    Findings:     A ventriculoperitoneal shunt catheter overlies the right chest.The cardiac silhouette is enlarged.  There is central vascular congestion and perihilar interstitial edema.  . There has been no significant interval change in the cardiopulmonary status when compared to the prior exam. No pneumothorax.   Impression:      No radiographically evident significant interval detrimental change in the  cardiopulmonary status when compared with the prior exam.       Electronically signed by: Yobani Monsivais MD  Date: 09/16/17  Time: 11:19          Cardiac Graphics: Echocardiogram:   2D echo with color flow doppler:   Results for orders placed or performed during the hospital encounter of 09/11/17   2D echo with color flow doppler   Result Value Ref Range    EF 55 55 - 65    Diastolic Dysfunction No     Est. PA Systolic Pressure 31.36        Pending Diagnostic Studies:     None        Final Active Diagnoses:    Diagnosis Date Noted POA    PRINCIPAL PROBLEM:  Acute on chronic respiratory failure with hypoxia [J96.21] 09/12/2017 Yes    COPD exacerbation [J44.1] 09/18/2017 Yes    TETO (acute kidney injury) [N17.9] 09/18/2017 Yes    Acute diastolic heart failure [I50.31] 09/13/2017 Yes    CAP (community acquired pneumonia) [J18.9] 09/11/2017 Yes    Morbid obesity with BMI of 40.0-44.9, adult [E66.01, Z68.41] 03/14/2017 Not Applicable    Essential hypertension [I10] 04/12/2016 Yes     Chronic    Obesity, morbid, BMI 40.0-49.9 [E66.01] 04/12/2016 Yes     Chronic    Atrial fibrillation with rapid ventricular response [I48.91] 10/01/2015 Yes    Lymphedema [I89.0] 08/11/2014 Yes     Chronic    Physical deconditioning [R53.81] 09/06/2013 Yes    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  Monitor work of breathing and o2 sats.        TETO (acute kidney injury)    TETO was pre-renal from over diuresis. She was given gentle IVF and this improved significant by time of discharge, she was to subsequently resume loop diuretic and appropriate dosing at home.          COPD exacerbation    Nebs, steroids, azithromycin  Spirometry appears consistent with  COPD  VQ scan low probability of chronic thromboembolic disease of any significant degree  Would benefit from nightly NIPPV due to likely obesity hypoventiation, MELISSA, COPD overlap syndrome, much improved with usage nightly in house    CO2 retention on ABG, obstruction shows on spirometry consistent with COPD as well as history of many years smoking.        Acute diastolic heart failure    Was present on admission. Resolved with diuresis.        CAP (community acquired pneumonia)    Possible diagnosis, was given adequate antibiotic therapy in house        Morbid obesity with BMI of 40.0-44.9, adult    BMI 43.2.  Obesity compounds patient co-morbidities and complicates treatment course.  Counseling given regarding diet modification and exercise recommendations.            Obesity, morbid, BMI 40.0-49.9    Body mass index is 43.23 kg/m². Morbid obesity complicates all aspects of disease management from diagnostic modalities to treatment. Weight loss encouraged and health benefits explained to patient.            Essential hypertension    Chronic, continue antihypertensive regimen.  Monitor BP closely, titrate medication as required for sustained BP control.          Atrial fibrillation with rapid ventricular response    Off the Cardizem drip.  Pt restarted on her oral CCB..  Continue warfarin- therapeutic at this time.  Monitor closely on monitor. HR well controlled on discharge.        Lymphedema    Continue ACE wrapping as per home regimen. Keep extremities elevated.          Physical deconditioning    PT/OT consultation. 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    Specialty:  Cardiology  Contact information:  [address] [facility] [address]  [phone]             Ricardo Yoo MD In 1 month.    Specialty:  Pulmonary Disease  Contact information:  [address]  [address]  [address]  [facility] [address]  [phone]                 Patient Instructions:     WHEELCHAIR FOR HOME USE   Order Specific Question Answer Comments   Hours in W/C per day: 12    Type of Wheelchair: Standard    Size(Width): 22"    Leg Support: Swing Away    Lap Belt: Velcro    Cushion: Gel    Height: 5' 7" (1.702 m)    Weight: 125.2 kg (276 lb 0.3 oz)    Does patient have medical equipment at home? shower chair    Does patient have medical equipment at home? wheelchair    Does patient have medical equipment at home? walker, standard    Does patient have medical equipment at home? 3-in-1 commode    Does patient have medical equipment at home? oxygen    Length of need (1-99 months): 99    Please check all that apply: Caregiver is capable and willing to operate wheelchair safely.      NEBULIZER FOR HOME USE   Order Specific Question Answer Comments   Height: 5' 7" (1.702 m)    Weight: 125.2 kg (276 lb 0.3 oz)    Does patient have medical equipment at home? shower chair    Does patient have medical equipment at home? wheelchair    Does patient have medical equipment at home? walker, standard    Does patient have medical equipment at home? 3-in-1 commode    Does patient have medical equipment at home? oxygen    Length of need (1-99 months): 12      Ambulatory consult to Sleep Disorders   Referral Priority: Routine Referral Type: Consultation   Number of Visits Requested: 1      Ambulatory consult to Pulmonology   Referral Priority: Routine Referral Type: Consultation   Referral Reason: Specialty Services Required    Referred to Provider: RICARDO YOO Requested Specialty: Pulmonary Disease   Number of Visits Requested: 1      Referral to Home health   Referral Priority: Routine Referral Type: Home Health Care   Referral Reason: Specialty Services " Required    Requested Specialty: Home Health Services    Number of Visits Requested: 1      Ambulatory consult to Sleep Disorders   Referral Priority: Routine Referral Type: Consultation   Number of Visits Requested: 1      Diet general       Medications:  Reconciled Home Medications:   Discharge Medication List as of 9/20/2017  4:03 PM      START taking these medications    Details   albuterol-ipratropium 2.5mg-0.5mg/3mL (DUO-NEB) 0.5 mg-3 mg(2.5 mg base)/3 mL nebulizer solution Take 3 mLs by nebulization every 4 (four) hours as needed for Wheezing. Rescue, Starting Tue 9/19/2017, Until Wed 9/19/2018, Normal      predniSONE (DELTASONE) 10 MG tablet Take 1 tablet (10 mg total) by mouth 2 (two) times daily., Starting Tue 9/19/2017, Until Tue 10/3/2017, Normal         CONTINUE these medications which have CHANGED    Details   diltiaZEM (CARDIZEM CD) 300 MG 24 hr capsule Take 1 capsule (300 mg total) by mouth once daily., Starting Wed 9/20/2017, Normal         CONTINUE these medications which have NOT CHANGED    Details   lisinopril (PRINIVIL,ZESTRIL) 20 MG tablet TAKE 1 TABLET EVERY DAY, Normal      torsemide (DEMADEX) 20 MG Tab TAKE 1 TABLET EVERY MORNING, Normal      !! warfarin (COUMADIN) 2.5 MG tablet Take 1-2 tablets (2.5-5 mg total) by mouth Daily., Starting 8/17/2016, Until Discontinued, Normal      !! warfarin (COUMADIN) 5 MG tablet TAKE 1 TABLET EVERY EVENING AS INSTRUCTED BY COUMADIN CLINIC, Normal       !! - Potential duplicate medications found. Please discuss with provider.        Time spent on the discharge of patient: 44 minutes      James Chahal MD  Department of Hospital Medicine  [facility]   no

## 2021-10-15 ENCOUNTER — TRANSCRIPTION ENCOUNTER (OUTPATIENT)
Age: 31
End: 2021-10-15

## 2021-10-15 VITALS
TEMPERATURE: 98 F | OXYGEN SATURATION: 100 % | RESPIRATION RATE: 17 BRPM | DIASTOLIC BLOOD PRESSURE: 67 MMHG | HEART RATE: 61 BPM | SYSTOLIC BLOOD PRESSURE: 112 MMHG

## 2021-10-15 RX ORDER — ASCORBIC ACID 60 MG
0 TABLET,CHEWABLE ORAL
Qty: 0 | Refills: 0 | DISCHARGE

## 2021-10-15 RX ORDER — ACETAMINOPHEN 500 MG
3 TABLET ORAL
Qty: 0 | Refills: 0 | DISCHARGE
Start: 2021-10-15

## 2021-10-15 RX ORDER — FAMOTIDINE 10 MG/ML
1 INJECTION INTRAVENOUS
Qty: 0 | Refills: 0 | DISCHARGE

## 2021-10-15 RX ORDER — IBUPROFEN 200 MG
1 TABLET ORAL
Qty: 0 | Refills: 0 | DISCHARGE
Start: 2021-10-15

## 2021-10-15 RX ADMIN — SODIUM CHLORIDE 3 MILLILITER(S): 9 INJECTION INTRAMUSCULAR; INTRAVENOUS; SUBCUTANEOUS at 06:12

## 2021-10-15 RX ADMIN — Medication 600 MILLIGRAM(S): at 12:30

## 2021-10-15 RX ADMIN — Medication 600 MILLIGRAM(S): at 11:30

## 2021-10-15 RX ADMIN — Medication 975 MILLIGRAM(S): at 09:50

## 2021-10-15 RX ADMIN — Medication 600 MILLIGRAM(S): at 06:30

## 2021-10-15 RX ADMIN — Medication 600 MILLIGRAM(S): at 07:00

## 2021-10-15 RX ADMIN — Medication 975 MILLIGRAM(S): at 08:32

## 2021-10-15 RX ADMIN — Medication 1 TABLET(S): at 11:30

## 2021-10-15 RX ADMIN — Medication 975 MILLIGRAM(S): at 15:50

## 2021-10-15 NOTE — DISCHARGE NOTE OB - PLAN OF CARE
31 y.o. now P1 admitted at 40wk+ for IOL  uncomplicated IOL, VAVD  uncomplicated Post partum course  d/c home PPD#1

## 2021-10-15 NOTE — DISCHARGE NOTE OB - MEDICATION SUMMARY - MEDICATIONS TO TAKE
I will START or STAY ON the medications listed below when I get home from the hospital:    ibuprofen 600 mg oral tablet  -- 1 tab(s) by mouth every 6 hours, As Needed  -- Indication: For vaginal delivery    acetaminophen 325 mg oral tablet  -- 3 tab(s) by mouth , As Needed  -- Indication: For vaginal delivery

## 2021-10-15 NOTE — DISCHARGE NOTE OB - CARE PLAN
1 Principal Discharge DX:	Vacuum-assisted vaginal delivery  Assessment and plan of treatment:	31 y.o. now P1 admitted at 40wk+ for IOL  uncomplicated IOL, VAVD  uncomplicated Post partum course  d/c home PPD#1

## 2021-10-15 NOTE — PROGRESS NOTE ADULT - SUBJECTIVE AND OBJECTIVE BOX
30 y/o P1 s/p VAVD PPD#1  pt ambulating, voiding  reports minimal pain and minimal lochia    vitals  /64 P 60 RR 18 T 97/9 O2sat 1005RA  abdomen appropriate distention  uterus firm/non tender at umbilical level  Lext minima edema/non tender bilat    admission CBC  wbc 10, h/h 12/37, ptl 164    a/p   30 y/o P1 s/p VAVD PPD#1 stable  d/c home  f/up 4-5 wks for post partum visit  PIH precautions

## 2021-10-15 NOTE — DISCHARGE NOTE OB - CARE PROVIDER_API CALL
Reinaldo Bower)  Obstetrics and Gynecology  [address] [place] [address]  Phone: [phone]  Fax: [phone]  Follow Up Time:

## 2021-10-15 NOTE — DISCHARGE NOTE OB - PATIENT PORTAL LINK FT
You can access the FollowMyHealth Patient Portal offered by [facility] by registering at the following website: [email or web address]. By joining [employer]’s FollowMyHealth portal, you will also be able to view your health information using other applications (apps) compatible with our system.

## 2021-10-15 NOTE — PROGRESS NOTE ADULT - SUBJECTIVE AND OBJECTIVE BOX
Pain Service Follow-up  Postop Day  1    S/P  C- Section    T(C): 36.6 (10-15-21 @ 06:00), Max: 37.1 (10-14-21 @ 22:00)  HR: 60 (10-15-21 @ 06:00) (60 - 106)  BP: 114/64 (10-15-21 @ 06:00) (97/61 - 114/64)  RR: 18 (10-15-21 @ 06:00) (18 - 18)  SpO2: 100% (10-15-21 @ 06:00) (98% - 100%)  Wt(kg): --      THERAPY:  Spinal Morphine     Sedation Score:	  [X] Alert	      [  ] Drowsy       [  ] Arousable	[  ] Asleep         [  ] Unresponsive    Side Effects:	  [X] None	      [  ] Nausea       [  ] Pruritus        [  ] Weakness   [  ] Numbness        ASSESSMENT/ PLAN   [ X ] Discontinue         [  ] Continue    [ X ] Documentation and Verification of current medications       Satisfactory Post Anesthetic Course

## 2021-10-15 NOTE — PROGRESS NOTE ADULT - SUBJECTIVE AND OBJECTIVE BOX
OB Progress Note:  PPD#1    S: 32yo   PPD#1 s/p VAVD w/ median episiotomy EBL 350ml. Patient feels well. Pain is well controlled. She is tolerating a regular diet and passing flatus. She is voiding spontaneously, and ambulating without difficulty. Denies CP/SOB. Denies lightheadedness/dizziness. Denies N/V.    O:  Vitals:  Vital Signs Last 24 Hrs  T(C): 37.1 (14 Oct 2021 22:00), Max: 37.1 (14 Oct 2021 22:00)  T(F): 98.7 (14 Oct 2021 22:00), Max: 98.7 (14 Oct 2021 22:00)  HR: 64 (14 Oct 2021 22:00) (64 - 106)  BP: 106/62 (14 Oct 2021 22:00) (97/61 - 111/64)  BP(mean): --  RR: 18 (14 Oct 2021 22:00) (18 - 18)  SpO2: 100% (14 Oct 2021 22:00) (98% - 100%)    MEDICATIONS  (STANDING):  acetaminophen   Tablet .. 975 milliGRAM(s) Oral <User Schedule>  diphtheria/tetanus/pertussis (acellular) Vaccine (ADAcel) 0.5 milliLiter(s) IntraMuscular once  ibuprofen  Tablet. 600 milliGRAM(s) Oral every 6 hours  influenza   Vaccine 0.5 milliLiter(s) IntraMuscular once  oxytocin Infusion 333.333 milliUNIT(s)/Min (1000 mL/Hr) IV Continuous <Continuous>  oxytocin Infusion 333.333 milliUNIT(s)/Min (1000 mL/Hr) IV Continuous <Continuous>  prenatal multivitamin 1 Tablet(s) Oral daily  sodium chloride 0.9% lock flush 3 milliLiter(s) IV Push every 8 hours      Labs:  Blood type: O Positive  Rubella IgG: RPR: Negative                          12.8   10.52<H> >-----------< 164    ( 10-13 @ 01:43 )             37.4          Physical Exam:  General: NAD  Abdomen: soft, non-tender, non-distended, fundus firm  Vaginal: Lochia wnl  Extremities: No erythema/edema    A/P: 32yo  PPD#1 s/p VAVD.  Patient is stable and doing well post-partum.   - Pain well controlled, continue current pain regimen  - Increase ambulation, SCDs when not ambulating  - Continue regular diet  - Reviewed PEC in detail and when to notified MD  - Discharge planning today    ERIC Hines-BC OB Progress Note:  PPD#1    S: 30yo   PPD#1 s/p VAVD w/ median episiotomy EBL 350ml. Patient feels well. Pain is well controlled. She is tolerating a regular diet and passing flatus. She is voiding spontaneously, and ambulating without difficulty. Denies CP/SOB. Denies lightheadedness/dizziness. Denies N/V.    O:  Vitals:  Vital Signs Last 24 Hrs  T(C): 37.1 (14 Oct 2021 22:00), Max: 37.1 (14 Oct 2021 22:00)  T(F): 98.7 (14 Oct 2021 22:00), Max: 98.7 (14 Oct 2021 22:00)  HR: 64 (14 Oct 2021 22:00) (64 - 106)  BP: 106/62 (14 Oct 2021 22:00) (97/61 - 111/64)  BP(mean): --  RR: 18 (14 Oct 2021 22:00) (18 - 18)  SpO2: 100% (14 Oct 2021 22:00) (98% - 100%)    MEDICATIONS  (STANDING):  acetaminophen   Tablet .. 975 milliGRAM(s) Oral <User Schedule>  diphtheria/tetanus/pertussis (acellular) Vaccine (ADAcel) 0.5 milliLiter(s) IntraMuscular once  ibuprofen  Tablet. 600 milliGRAM(s) Oral every 6 hours  influenza   Vaccine 0.5 milliLiter(s) IntraMuscular once  oxytocin Infusion 333.333 milliUNIT(s)/Min (1000 mL/Hr) IV Continuous <Continuous>  oxytocin Infusion 333.333 milliUNIT(s)/Min (1000 mL/Hr) IV Continuous <Continuous>  prenatal multivitamin 1 Tablet(s) Oral daily  sodium chloride 0.9% lock flush 3 milliLiter(s) IV Push every 8 hours      Labs:  Blood type: O Positive  Rubella IgG: RPR: Negative                          12.8   10.52<H> >-----------< 164    ( 10-13 @ 01:43 )             37.4          Physical Exam:  General: NAD  Abdomen: soft, non-tender, non-distended, fundus firm  Vaginal: Lochia wnl no erythema no swelling noted  Extremities: No erythema/edema    A/P: 30yo  PPD#1 s/p VAVD.  Patient is stable and doing well post-partum.   - Pain well controlled, continue current pain regimen  - Increase ambulation, SCDs when not ambulating  - Continue regular diet  - Reviewed PEC in detail and when to notified MD  - Discharge planning today    ERIC Hines-BC

## 2022-03-24 NOTE — ED PROVIDER NOTE - CHILD ABUSE FACILITY
[Palliative] : Goals of care discussed with patient: Palliative [Palliative Care Plan] : not applicable at this time [facility]

## 2022-08-09 ENCOUNTER — EMERGENCY (EMERGENCY)
Facility: HOSPITAL | Age: 32
LOS: 1 days | Discharge: HOME | End: 2022-08-09
Attending: EMERGENCY MEDICINE
Payer: COMMERCIAL

## 2022-08-09 VITALS
HEIGHT: 67 IN | RESPIRATION RATE: 18 BRPM | TEMPERATURE: 98 F | SYSTOLIC BLOOD PRESSURE: 122 MMHG | HEART RATE: 85 BPM | OXYGEN SATURATION: 100 % | DIASTOLIC BLOOD PRESSURE: 75 MMHG | WEIGHT: 154.98 LBS

## 2022-08-09 VITALS
TEMPERATURE: 99 F | DIASTOLIC BLOOD PRESSURE: 80 MMHG | OXYGEN SATURATION: 100 % | RESPIRATION RATE: 20 BRPM | SYSTOLIC BLOOD PRESSURE: 118 MMHG | HEART RATE: 64 BPM

## 2022-08-09 DIAGNOSIS — Z90.79 ACQUIRED ABSENCE OF OTHER GENITAL ORGAN(S): Chronic | ICD-10-CM

## 2022-08-09 LAB
RAPID RVP RESULT: DETECTED
S PYO AG SPEC QL IA: NEGATIVE — SIGNIFICANT CHANGE UP
SARS-COV-2 RNA SPEC QL NAA+PROBE: DETECTED

## 2022-08-09 PROCEDURE — 87880 STREP A ASSAY W/OPTIC: CPT

## 2022-08-09 PROCEDURE — 99285 EMERGENCY DEPT VISIT HI MDM: CPT

## 2022-08-09 PROCEDURE — 99284 EMERGENCY DEPT VISIT MOD MDM: CPT

## 2022-08-09 PROCEDURE — 87081 CULTURE SCREEN ONLY: CPT

## 2022-08-09 PROCEDURE — 0225U NFCT DS DNA&RNA 21 SARSCOV2: CPT

## 2022-08-09 RX ORDER — IBUPROFEN 200 MG
600 TABLET ORAL ONCE
Refills: 0 | Status: COMPLETED | OUTPATIENT
Start: 2022-08-09 | End: 2022-08-09

## 2022-08-09 RX ORDER — ACETAMINOPHEN 500 MG
975 TABLET ORAL ONCE
Refills: 0 | Status: COMPLETED | OUTPATIENT
Start: 2022-08-09 | End: 2022-08-09

## 2022-08-09 RX ORDER — ALBUTEROL 90 UG/1
1 AEROSOL, METERED ORAL ONCE
Refills: 0 | Status: DISCONTINUED | OUTPATIENT
Start: 2022-08-09 | End: 2022-08-13

## 2022-08-09 RX ORDER — ALBUTEROL 90 UG/1
1 AEROSOL, METERED ORAL ONCE
Refills: 0 | Status: COMPLETED | OUTPATIENT
Start: 2022-08-09 | End: 2023-07-08

## 2022-08-09 RX ADMIN — Medication 975 MILLIGRAM(S): at 21:15

## 2022-08-09 RX ADMIN — Medication 600 MILLIGRAM(S): at 21:16

## 2022-08-09 NOTE — ED PROVIDER NOTE - OBJECTIVE STATEMENT
33 y/o female PMHx asthma on ventolin (does not have albuterol or rescue inhaler) never intubated now presenting to the ED with left sided throat pain x1 day. Reports she sees drainage from left tonsill and taste foul smelling. Patient reported dry cough, chills, fatigue and associated headache for last couple days and was exposed to someone with COVID 1 day prior to onset of symptoms. Denied CP, SOB, abdominal pain, N/V/d, fever, urinary symptoms, rash, hoarseness, difficulty swallowing or tolerating secretions

## 2022-08-09 NOTE — ED PROVIDER NOTE - ATTENDING APP SHARED VISIT CONTRIBUTION OF CARE
Attending MD Campbell:   I personally have seen and examined this patient. I have performed a substantive portion of the visit including all aspects of the medical decision making.   Physician assistant note reviewed and agree on plan of care and except where noted.        32F presenting with throat pain, cough and chills x 3 days. Exam with mild erythema and scant exudate to left tonsil, mildly ttp left submandibular area. Likely viral pharyngitis, plan for rapid strep, viral swab, supportive care       *The above represents an initial assessment/impression. Please refer to progress notes for potential changes in patient clinical course*

## 2022-08-09 NOTE — ED PROVIDER NOTE - THROAT FINDINGS
OROPHARYNGEAL EXUDATE/THROAT RED/uvula midline/NO VESICLES/ULCERS/TONSILLAR SWELLING/NO DROOLING/NO TONGUE ELEVATION/NO STRIDOR

## 2022-08-09 NOTE — ED PROVIDER NOTE - NS ED ATTENDING STATEMENT MOD
This was a shared visit with the ARON. I reviewed and verified the documentation and independently performed the documented:

## 2022-08-09 NOTE — ED PROVIDER NOTE - PATIENT PORTAL LINK FT
You can access the FollowMyHealth Patient Portal offered by [facility] by registering at the following website: [email or web address]. By joining [employer]’s FollowMyHealth portal, you will also be able to view your health information using other applications (apps) compatible with our system.

## 2022-08-09 NOTE — ED ADULT TRIAGE NOTE - TEMPERATURE IN FAHRENHEIT (DEGREES F)
[address] EMERGENCY DEPT  [address] [facility] [address] [phone]    Work/School Note    Date: 12/26/2021     To Whom It May concern:    Feliciano Melendez was evaluated by the following provider(s):  Attending Provider: Kiesha Nuñez MD  Nurse Practitioner: Sophia Carlisle NP.   COVID19 virus is suspected. Per the CDC guidelines we recommend home isolation until the following conditions are all met:    1. At least 10 days have passed since symptoms first appeared and  2. At least 24 hours have passed since last fever without the use of fever-reducing medications and  3.  Symptoms (e.g., cough, shortness of breath) have improved      Sincerely,          Stephen Ring RN
[address] EMERGENCY DEPT  [address] [facility] [address] [phone]    Work/School Note    Date: 12/26/2021     To Whom It May concern:    Erlin Santillan was evaluated by the following provider(s):  Attending Provider: Kasie Main MD  Nurse Practitioner: Bell Thompson NP.   COVID19 virus is suspected. Per the CDC guidelines we recommend home isolation until the following conditions are all met:    1. At least 10 days have passed since symptoms first appeared and  2. At least 24 hours have passed since last fever without the use of fever-reducing medications and  3.  Symptoms (e.g., cough, shortness of breath) have improved      Sincerely,          Alex Cuadra NP
[phone]

## 2022-08-09 NOTE — ED PROVIDER NOTE - NSFOLLOWUPINSTRUCTIONS_ED_ALL_ED_FT
Follow up with your Primary Care Physician within the next 2-3 days  Bring a copy of your test results with you to your appointment  Continue your current medication regimen  Return to the Emergency Room if you experience new or worsening symptoms abdominal pain, nausea, vomiting, fever chills, cough, chest pain, shortness of breath, dizziness, slurred speech, weakness, gait abnormality   TAKE TYLENOL 1000MG EVERY 6 HOURS AS NEEDED FOR PAIN AND ALTERNATE WITH EITHER MOTRIN ALEVE ADVIL 400MG EVERY 8 HOURS WITH FOOD    CALL TOMORROW [phone] FOR YOUR VIRAL SWAB RESULTS AFTER 1130AM

## 2022-08-09 NOTE — ED ADULT NURSE NOTE - OBJECTIVE STATEMENT
33 YO female with PMH asthma, and fallopian tube removed, via walk in presenting with complaints of throat pain. As per patient, pt has had "swollen tonsil" for the past 3 days, described as sore/achy which goes from 7/10 to 9/10, and that now she sees something draining from her left side of throat, and that pt has difficulty swallowing. Pt states that she has had headache today, described as 6/7 frontal aching, worsened with moving head forward. Pt states she has nasal congestion, and chills.

## 2022-08-10 ENCOUNTER — TRANSCRIPTION ENCOUNTER (OUTPATIENT)
Age: 32
End: 2022-08-10

## 2023-06-22 NOTE — ED ADULT TRIAGE NOTE - INTERNATIONAL TRAVEL COUNTRIES
Problem: At Risk for Falls  Goal: # Patient does not fall  Outcome: Outcome Not Met, Continue to Monitor  Goal: # Takes action to control fall-related risks  Outcome: Outcome Not Met, Continue to Monitor  Goal: # Verbalizes understanding of fall risk/precautions  Description: Document education using the patient education activity  Outcome: Outcome Not Met, Continue to Monitor     Problem: At Risk for Injury Due to Fall  Goal: # Patient does not fall  Outcome: Outcome Not Met, Continue to Monitor  Goal: # Takes action to control condition specific risks  Outcome: Outcome Not Met, Continue to Monitor  Goal: # Verbalizes understanding of fall-related injury personal risks  Description: Document education using the patient education activity  Outcome: Outcome Not Met, Continue to Monitor     Problem: Pain  Goal: #Acceptable pain level achieved/maintained at rest using NRS/Faces  Description: This goal is used for patients who can self-report.  Acceptable means the level is at or below the identified comfort/function goal.  Outcome: Outcome Not Met, Continue to Monitor  Goal: # Acceptable pain level achieved/maintained at rest using NRS/Faces without oversedation (opioid naive or PCA/Epidural infusion)  Description: This goal is used if Opioid-naïve or on PCA/Epidural Infusion.  Outcome: Outcome Not Met, Continue to Monitor  Goal: Acceptable pain/comfort level is achieved/maintained at rest (based on Pain Behaviors Scale)  Description: This goal is used for patients who are not able to self-report pain and are assessed for pain using the Pain Behaviors Scale  Outcome: Outcome Not Met, Continue to Monitor  Goal: # Verbalizes understanding of pain management  Description: Documented in Patient Education Activity  Outcome: Outcome Not Met, Continue to Monitor     Problem: Impaired Physical Mobility  Goal: # Bed mobility, ambulation, and ADLs are maintained or returned to baseline during hospitalization  Outcome: Outcome  Not Met, Continue to Monitor     Problem: Cardiac Rhythm Disturbances with or without Devices  Goal: Hemodynamic stability achieved/maintained  Outcome: Outcome Not Met, Continue to Monitor  Goal: Anxiety is controlled  Outcome: Outcome Not Met, Continue to Monitor  Goal: Participates in ADL/Activity without s/s of intolerance  Outcome: Outcome Not Met, Continue to Monitor  Goal: Urinary elimination pattern returned to baseline  Description: Patient must be making adequate amounts of urine output (240cc/8 hours) and voiding. If indwelling urinary catheter: Remove asap (no later an Day 2 or provider must specify reason for continued use).  Outcome: Outcome Not Met, Continue to Monitor  Goal: Verbalizes understanding of rhythm disturbance, treatment procedure and pre, post-, and d/c care specific to intervention  Description: Document on Patient Education Activity  Outcome: Outcome Not Met, Continue to Monitor     Patient alert and oriented x 4. Patient with slurred speech, left sided weakness more than right. Patient on room air. All due medications given. No pain indicated at this time. External cath in place. Patient turned Q2. Fall precautions in place. Bed alarm set. All needs attended to at this time. Raissa Henry RN     Center

## 2023-10-19 NOTE — OB RN DELIVERY SUMMARY - BABY A: APGAR 5 MIN HEART RATE, DELIVERY
Medication montana'd up for refill.    Last office visit 5/24/2023    TAWANA Gonzales    
(2) more than 100 beats/min

## 2023-10-20 ENCOUNTER — EMERGENCY (EMERGENCY)
Age: 33
LOS: 1 days | Discharge: STILL ADMITTED OR ADMINISTRATIVE | End: 2023-10-20
Admitting: PEDIATRICS
Payer: SELF-PAY

## 2023-10-20 DIAGNOSIS — Z90.79 ACQUIRED ABSENCE OF OTHER GENITAL ORGAN(S): Chronic | ICD-10-CM

## 2023-10-20 PROCEDURE — L9981: CPT

## 2024-08-08 NOTE — ED PROVIDER NOTE - THROAT, MLM
Stapled Wound Care     for ______________    [facility] [phone]    No strenuous activity for 48 hours. Resume moderate activity in 48 hours. No heavy exercising until you are seen for follow up in one week.    Take Tylenol as needed for discomfort.                                        Do not drink alcoholic beverages for 48 hours.    Keep the pressure bandage in place for 24 hours. If the bandage becomes blood tinged or loose, reinforce it with gauze and tape.       (Refer to the reverse side of this page for management of bleeding).    Remove bandage in 24 hours and begin wound care as follows:         Rinse the stapled area with tap water. (shower / bathe / shampoo normally)  Dry wound with Q tip or gauze pad  Apply Polysporin or Bacitracin Ointment to the staples.  Do NOT use Neosporin Ointment *  Cover the wound with a bandaid or nonstick gauze pad and paper tape.  Repeat wound care once a day until staples are removed.                  BLEEDING:    Use tightly rolled up gauze or cloth to apply direct pressure over the bandage for 20   minutes.  Reapply pressure for an additional 20 minutes if necessary  Call the office or go to the nearest emergency room if pressure fails to stop the bleeding.  Use additional gauze and tape to maintain pressure once the bleeding has stopped.  Begin wound care 24 hours after surgery as directed.              PAIN:    1. Post operative pain should slowly get better, beginning the evening after surgery.  2.  A sudden or severe increase in pain may indicate a problem. Call the office if this occurs.          In case of emergency call Dr. Orr  at [phone]       
abnormal/expand...

## 2024-12-18 NOTE — ASU DISCHARGE PLAN (ADULT/PEDIATRIC) - NOTHING PER VAGINA DURATION
Called patient and verified with two identifiers. Went over general instructions for monitor. Told her it shouldn't make any noise and she thinks it may have been something else.    Until cleared by MD

## 2025-01-21 NOTE — OB RN DELIVERY SUMMARY - NS_NUCHALCORD_OBGYN_ALL_OB
Follow-up with your primary care doctor and orthopedics as discussed.  Return to emergency department for any new or worsening symptoms.   x1

## 2025-07-23 NOTE — OB RN PATIENT PROFILE - PATIENT REPRESENTATIVE: ( YOU CAN CHOOSE ANY PERSON THAT CAN ASSIST YOU WITH YOUR HEALTH CARE PREFERENCES, DOES NOT HAVE TO BE A SPOUSE, IMMEDIATE FAMILY OR SIGNIFICANT OTHER/PARTNER)
[FreeTextEntry1] : No evidence of infection or collection. No further restrictions or compression needed. Scar care was reviewed. Follow-up at 6 months for reassessment or as needed. Yes